# Patient Record
Sex: FEMALE | Race: WHITE | NOT HISPANIC OR LATINO | Employment: PART TIME | URBAN - METROPOLITAN AREA
[De-identification: names, ages, dates, MRNs, and addresses within clinical notes are randomized per-mention and may not be internally consistent; named-entity substitution may affect disease eponyms.]

---

## 2021-09-24 ENCOUNTER — HOSPITAL ENCOUNTER (OUTPATIENT)
Facility: CLINIC | Age: 36
End: 2021-09-24
Admitting: OBSTETRICS & GYNECOLOGY
Payer: COMMERCIAL

## 2022-03-05 ENCOUNTER — HOSPITAL ENCOUNTER (OUTPATIENT)
Facility: CLINIC | Age: 37
Setting detail: OBSERVATION
Discharge: HOME | End: 2022-03-06
Attending: EMERGENCY MEDICINE | Admitting: SURGERY
Payer: COMMERCIAL

## 2022-03-05 DIAGNOSIS — G89.18 ACUTE POST-OPERATIVE PAIN: Primary | ICD-10-CM

## 2022-03-05 DIAGNOSIS — K35.209 ACUTE APPENDICITIS WITH GENERALIZED PERITONITIS, WITHOUT GANGRENE OR ABSCESS, UNSPECIFIED WHETHER PERFORATION PRESENT: ICD-10-CM

## 2022-03-05 PROBLEM — O24.410 DIET CONTROLLED WHITE CLASSIFICATION A1 GESTATIONAL DIABETES MELLITUS (GDM): Status: ACTIVE | Noted: 2021-10-08

## 2022-03-05 PROBLEM — Z87.59 HISTORY OF POSTPARTUM HEMORRHAGE: Status: ACTIVE | Noted: 2021-10-08

## 2022-03-05 PROBLEM — B02.9 HERPES ZOSTER WITHOUT COMPLICATION: Status: ACTIVE | Noted: 2020-08-22

## 2022-03-05 PROBLEM — O42.92 FULL-TERM PREMATURE RUPTURE OF MEMBRANES: Status: ACTIVE | Noted: 2021-10-08

## 2022-03-05 PROBLEM — O09.523 AMA (ADVANCED MATERNAL AGE) MULTIGRAVIDA 35+, THIRD TRIMESTER: Status: ACTIVE | Noted: 2021-10-08

## 2022-03-05 LAB
ALBUMIN SERPL-MCNC: 4.3 G/DL (ref 3.4–5)
ALP SERPL-CCNC: 74 U/L (ref 40–150)
ALT SERPL W P-5'-P-CCNC: 21 U/L (ref 0–50)
ANION GAP SERPL CALCULATED.3IONS-SCNC: 7 MMOL/L (ref 3–14)
AST SERPL W P-5'-P-CCNC: 11 U/L (ref 0–45)
BASOPHILS # BLD AUTO: 0 10E3/UL (ref 0–0.2)
BASOPHILS NFR BLD AUTO: 0 %
BILIRUB SERPL-MCNC: 0.9 MG/DL (ref 0.2–1.3)
BUN SERPL-MCNC: 21 MG/DL (ref 7–30)
CALCIUM SERPL-MCNC: 9.5 MG/DL (ref 8.5–10.1)
CHLORIDE BLD-SCNC: 104 MMOL/L (ref 94–109)
CO2 SERPL-SCNC: 26 MMOL/L (ref 20–32)
CREAT SERPL-MCNC: 0.8 MG/DL (ref 0.52–1.04)
EOSINOPHIL # BLD AUTO: 0 10E3/UL (ref 0–0.7)
EOSINOPHIL NFR BLD AUTO: 0 %
ERYTHROCYTE [DISTWIDTH] IN BLOOD BY AUTOMATED COUNT: 12.6 % (ref 10–15)
GFR SERPL CREATININE-BSD FRML MDRD: >90 ML/MIN/1.73M2
GLUCOSE BLD-MCNC: 131 MG/DL (ref 70–99)
HCG SERPL QL: NEGATIVE
HCT VFR BLD AUTO: 39.6 % (ref 35–47)
HGB BLD-MCNC: 13.1 G/DL (ref 11.7–15.7)
IMM GRANULOCYTES # BLD: 0 10E3/UL
IMM GRANULOCYTES NFR BLD: 0 %
LIPASE SERPL-CCNC: 118 U/L (ref 73–393)
LYMPHOCYTES # BLD AUTO: 0.7 10E3/UL (ref 0.8–5.3)
LYMPHOCYTES NFR BLD AUTO: 7 %
MCH RBC QN AUTO: 27.2 PG (ref 26.5–33)
MCHC RBC AUTO-ENTMCNC: 33.1 G/DL (ref 31.5–36.5)
MCV RBC AUTO: 82 FL (ref 78–100)
MONOCYTES # BLD AUTO: 0.5 10E3/UL (ref 0–1.3)
MONOCYTES NFR BLD AUTO: 5 %
NEUTROPHILS # BLD AUTO: 9.4 10E3/UL (ref 1.6–8.3)
NEUTROPHILS NFR BLD AUTO: 88 %
NRBC # BLD AUTO: 0 10E3/UL
NRBC BLD AUTO-RTO: 0 /100
PLATELET # BLD AUTO: 218 10E3/UL (ref 150–450)
POTASSIUM BLD-SCNC: 3.5 MMOL/L (ref 3.4–5.3)
PROT SERPL-MCNC: 7.7 G/DL (ref 6.8–8.8)
RBC # BLD AUTO: 4.81 10E6/UL (ref 3.8–5.2)
SODIUM SERPL-SCNC: 137 MMOL/L (ref 133–144)
WBC # BLD AUTO: 10.7 10E3/UL (ref 4–11)

## 2022-03-05 PROCEDURE — 99285 EMERGENCY DEPT VISIT HI MDM: CPT | Mod: 25

## 2022-03-05 PROCEDURE — 80053 COMPREHEN METABOLIC PANEL: CPT | Performed by: EMERGENCY MEDICINE

## 2022-03-05 PROCEDURE — 83690 ASSAY OF LIPASE: CPT | Performed by: EMERGENCY MEDICINE

## 2022-03-05 PROCEDURE — 96374 THER/PROPH/DIAG INJ IV PUSH: CPT

## 2022-03-05 PROCEDURE — 258N000003 HC RX IP 258 OP 636: Performed by: EMERGENCY MEDICINE

## 2022-03-05 PROCEDURE — 36415 COLL VENOUS BLD VENIPUNCTURE: CPT | Performed by: EMERGENCY MEDICINE

## 2022-03-05 PROCEDURE — 250N000011 HC RX IP 250 OP 636: Performed by: EMERGENCY MEDICINE

## 2022-03-05 PROCEDURE — 96375 TX/PRO/DX INJ NEW DRUG ADDON: CPT

## 2022-03-05 PROCEDURE — C9803 HOPD COVID-19 SPEC COLLECT: HCPCS

## 2022-03-05 PROCEDURE — 84703 CHORIONIC GONADOTROPIN ASSAY: CPT | Performed by: EMERGENCY MEDICINE

## 2022-03-05 PROCEDURE — 96361 HYDRATE IV INFUSION ADD-ON: CPT

## 2022-03-05 PROCEDURE — 85025 COMPLETE CBC W/AUTO DIFF WBC: CPT | Performed by: EMERGENCY MEDICINE

## 2022-03-05 PROCEDURE — 96376 TX/PRO/DX INJ SAME DRUG ADON: CPT

## 2022-03-05 RX ORDER — SODIUM CHLORIDE 9 MG/ML
INJECTION, SOLUTION INTRAVENOUS CONTINUOUS
Status: DISCONTINUED | OUTPATIENT
Start: 2022-03-05 | End: 2022-03-06 | Stop reason: HOSPADM

## 2022-03-05 RX ORDER — ACETAMINOPHEN 500 MG
1000 TABLET ORAL ONCE
Status: COMPLETED | OUTPATIENT
Start: 2022-03-05 | End: 2022-03-06

## 2022-03-05 RX ORDER — ONDANSETRON 2 MG/ML
4 INJECTION INTRAMUSCULAR; INTRAVENOUS EVERY 30 MIN PRN
Status: COMPLETED | OUTPATIENT
Start: 2022-03-05 | End: 2022-03-06

## 2022-03-05 RX ADMIN — SODIUM CHLORIDE 1000 ML: 9 INJECTION, SOLUTION INTRAVENOUS at 23:22

## 2022-03-05 RX ADMIN — ONDANSETRON 4 MG: 2 INJECTION INTRAMUSCULAR; INTRAVENOUS at 23:25

## 2022-03-05 ASSESSMENT — ENCOUNTER SYMPTOMS
DIZZINESS: 1
DIARRHEA: 0
NAUSEA: 1
FEVER: 0
CHILLS: 1
CONSTIPATION: 0
APPETITE CHANGE: 1
HEADACHES: 0
COUGH: 0
VOMITING: 0
DYSURIA: 0
ABDOMINAL PAIN: 1

## 2022-03-06 ENCOUNTER — APPOINTMENT (OUTPATIENT)
Dept: CT IMAGING | Facility: CLINIC | Age: 37
End: 2022-03-06
Attending: EMERGENCY MEDICINE
Payer: COMMERCIAL

## 2022-03-06 ENCOUNTER — ANESTHESIA (OUTPATIENT)
Dept: SURGERY | Facility: CLINIC | Age: 37
End: 2022-03-06
Payer: COMMERCIAL

## 2022-03-06 ENCOUNTER — ANESTHESIA EVENT (OUTPATIENT)
Dept: SURGERY | Facility: CLINIC | Age: 37
End: 2022-03-06
Payer: COMMERCIAL

## 2022-03-06 VITALS
SYSTOLIC BLOOD PRESSURE: 108 MMHG | TEMPERATURE: 99.1 F | OXYGEN SATURATION: 96 % | BODY MASS INDEX: 19.62 KG/M2 | HEIGHT: 67 IN | WEIGHT: 125 LBS | RESPIRATION RATE: 15 BRPM | DIASTOLIC BLOOD PRESSURE: 67 MMHG | HEART RATE: 75 BPM

## 2022-03-06 PROBLEM — K35.209 ACUTE APPENDICITIS WITH GENERALIZED PERITONITIS, WITHOUT GANGRENE OR ABSCESS, UNSPECIFIED WHETHER PERFORATION PRESENT: Status: ACTIVE | Noted: 2022-03-06

## 2022-03-06 LAB — SARS-COV-2 RNA RESP QL NAA+PROBE: NEGATIVE

## 2022-03-06 PROCEDURE — 360N000076 HC SURGERY LEVEL 3, PER MIN: Performed by: SURGERY

## 2022-03-06 PROCEDURE — 250N000011 HC RX IP 250 OP 636: Performed by: EMERGENCY MEDICINE

## 2022-03-06 PROCEDURE — 370N000017 HC ANESTHESIA TECHNICAL FEE, PER MIN: Performed by: SURGERY

## 2022-03-06 PROCEDURE — 258N000003 HC RX IP 258 OP 636: Performed by: ANESTHESIOLOGY

## 2022-03-06 PROCEDURE — 250N000009 HC RX 250: Performed by: EMERGENCY MEDICINE

## 2022-03-06 PROCEDURE — 250N000013 HC RX MED GY IP 250 OP 250 PS 637: Performed by: EMERGENCY MEDICINE

## 2022-03-06 PROCEDURE — 44970 LAPAROSCOPY APPENDECTOMY: CPT | Mod: AS | Performed by: PHYSICIAN ASSISTANT

## 2022-03-06 PROCEDURE — 250N000009 HC RX 250: Performed by: SURGERY

## 2022-03-06 PROCEDURE — 250N000011 HC RX IP 250 OP 636: Performed by: SURGERY

## 2022-03-06 PROCEDURE — 999N000141 HC STATISTIC PRE-PROCEDURE NURSING ASSESSMENT: Performed by: SURGERY

## 2022-03-06 PROCEDURE — 710N000012 HC RECOVERY PHASE 2, PER MINUTE: Performed by: SURGERY

## 2022-03-06 PROCEDURE — G0378 HOSPITAL OBSERVATION PER HR: HCPCS

## 2022-03-06 PROCEDURE — 710N000009 HC RECOVERY PHASE 1, LEVEL 1, PER MIN: Performed by: SURGERY

## 2022-03-06 PROCEDURE — 87635 SARS-COV-2 COVID-19 AMP PRB: CPT | Performed by: EMERGENCY MEDICINE

## 2022-03-06 PROCEDURE — 250N000011 HC RX IP 250 OP 636: Performed by: NURSE ANESTHETIST, CERTIFIED REGISTERED

## 2022-03-06 PROCEDURE — 272N000001 HC OR GENERAL SUPPLY STERILE: Performed by: SURGERY

## 2022-03-06 PROCEDURE — 250N000025 HC SEVOFLURANE, PER MIN: Performed by: SURGERY

## 2022-03-06 PROCEDURE — 258N000001 HC RX 258: Performed by: SURGERY

## 2022-03-06 PROCEDURE — 88304 TISSUE EXAM BY PATHOLOGIST: CPT | Mod: TC | Performed by: SURGERY

## 2022-03-06 PROCEDURE — 250N000009 HC RX 250: Performed by: NURSE ANESTHETIST, CERTIFIED REGISTERED

## 2022-03-06 PROCEDURE — 44970 LAPAROSCOPY APPENDECTOMY: CPT | Performed by: SURGERY

## 2022-03-06 PROCEDURE — 258N000003 HC RX IP 258 OP 636: Performed by: SURGERY

## 2022-03-06 PROCEDURE — 250N000013 HC RX MED GY IP 250 OP 250 PS 637: Performed by: PHYSICIAN ASSISTANT

## 2022-03-06 PROCEDURE — 258N000003 HC RX IP 258 OP 636: Performed by: EMERGENCY MEDICINE

## 2022-03-06 PROCEDURE — 96376 TX/PRO/DX INJ SAME DRUG ADON: CPT | Mod: XU

## 2022-03-06 PROCEDURE — 74177 CT ABD & PELVIS W/CONTRAST: CPT

## 2022-03-06 PROCEDURE — 99220 PR INITIAL OBSERVATION CARE,LEVEL III: CPT | Mod: 57 | Performed by: SURGERY

## 2022-03-06 RX ORDER — HYDROCODONE BITARTRATE AND ACETAMINOPHEN 5; 325 MG/1; MG/1
1-2 TABLET ORAL EVERY 4 HOURS PRN
Qty: 15 TABLET | Refills: 0 | Status: SHIPPED | OUTPATIENT
Start: 2022-03-06

## 2022-03-06 RX ORDER — FENTANYL CITRATE 50 UG/ML
INJECTION, SOLUTION INTRAMUSCULAR; INTRAVENOUS PRN
Status: DISCONTINUED | OUTPATIENT
Start: 2022-03-06 | End: 2022-03-06

## 2022-03-06 RX ORDER — ONDANSETRON 2 MG/ML
INJECTION INTRAMUSCULAR; INTRAVENOUS PRN
Status: DISCONTINUED | OUTPATIENT
Start: 2022-03-06 | End: 2022-03-06

## 2022-03-06 RX ORDER — HYDROMORPHONE HYDROCHLORIDE 1 MG/ML
.3-.5 INJECTION, SOLUTION INTRAMUSCULAR; INTRAVENOUS; SUBCUTANEOUS
Status: DISCONTINUED | OUTPATIENT
Start: 2022-03-06 | End: 2022-03-06 | Stop reason: HOSPADM

## 2022-03-06 RX ORDER — NEOSTIGMINE METHYLSULFATE 1 MG/ML
VIAL (ML) INJECTION PRN
Status: DISCONTINUED | OUTPATIENT
Start: 2022-03-06 | End: 2022-03-06

## 2022-03-06 RX ORDER — IOPAMIDOL 755 MG/ML
63 INJECTION, SOLUTION INTRAVASCULAR ONCE
Status: COMPLETED | OUTPATIENT
Start: 2022-03-06 | End: 2022-03-06

## 2022-03-06 RX ORDER — OXYCODONE HYDROCHLORIDE 5 MG/1
5 TABLET ORAL EVERY 4 HOURS PRN
Status: DISCONTINUED | OUTPATIENT
Start: 2022-03-06 | End: 2022-03-06 | Stop reason: HOSPADM

## 2022-03-06 RX ORDER — PROPOFOL 10 MG/ML
INJECTION, EMULSION INTRAVENOUS CONTINUOUS PRN
Status: DISCONTINUED | OUTPATIENT
Start: 2022-03-06 | End: 2022-03-06

## 2022-03-06 RX ORDER — LIDOCAINE HYDROCHLORIDE 20 MG/ML
INJECTION, SOLUTION INFILTRATION; PERINEURAL PRN
Status: DISCONTINUED | OUTPATIENT
Start: 2022-03-06 | End: 2022-03-06

## 2022-03-06 RX ORDER — KETOROLAC TROMETHAMINE 30 MG/ML
INJECTION, SOLUTION INTRAMUSCULAR; INTRAVENOUS PRN
Status: DISCONTINUED | OUTPATIENT
Start: 2022-03-06 | End: 2022-03-06

## 2022-03-06 RX ORDER — ONDANSETRON 4 MG/1
4 TABLET, ORALLY DISINTEGRATING ORAL EVERY 30 MIN PRN
Status: DISCONTINUED | OUTPATIENT
Start: 2022-03-06 | End: 2022-03-06 | Stop reason: HOSPADM

## 2022-03-06 RX ORDER — HYDRALAZINE HYDROCHLORIDE 20 MG/ML
2.5-5 INJECTION INTRAMUSCULAR; INTRAVENOUS
Status: DISCONTINUED | OUTPATIENT
Start: 2022-03-06 | End: 2022-03-06 | Stop reason: HOSPADM

## 2022-03-06 RX ORDER — PROPOFOL 10 MG/ML
INJECTION, EMULSION INTRAVENOUS PRN
Status: DISCONTINUED | OUTPATIENT
Start: 2022-03-06 | End: 2022-03-06

## 2022-03-06 RX ORDER — HYDROCODONE BITARTRATE AND ACETAMINOPHEN 5; 325 MG/1; MG/1
1 TABLET ORAL
Status: COMPLETED | OUTPATIENT
Start: 2022-03-06 | End: 2022-03-06

## 2022-03-06 RX ORDER — ONDANSETRON 4 MG/1
4 TABLET, ORALLY DISINTEGRATING ORAL EVERY 6 HOURS PRN
Status: DISCONTINUED | OUTPATIENT
Start: 2022-03-06 | End: 2022-03-06 | Stop reason: HOSPADM

## 2022-03-06 RX ORDER — ONDANSETRON 2 MG/ML
4 INJECTION INTRAMUSCULAR; INTRAVENOUS EVERY 30 MIN PRN
Status: DISCONTINUED | OUTPATIENT
Start: 2022-03-06 | End: 2022-03-06 | Stop reason: HOSPADM

## 2022-03-06 RX ORDER — SODIUM CHLORIDE, SODIUM LACTATE, POTASSIUM CHLORIDE, CALCIUM CHLORIDE 600; 310; 30; 20 MG/100ML; MG/100ML; MG/100ML; MG/100ML
INJECTION, SOLUTION INTRAVENOUS CONTINUOUS
Status: DISCONTINUED | OUTPATIENT
Start: 2022-03-06 | End: 2022-03-06 | Stop reason: HOSPADM

## 2022-03-06 RX ORDER — ONDANSETRON 2 MG/ML
4 INJECTION INTRAMUSCULAR; INTRAVENOUS EVERY 6 HOURS PRN
Status: DISCONTINUED | OUTPATIENT
Start: 2022-03-06 | End: 2022-03-06 | Stop reason: HOSPADM

## 2022-03-06 RX ORDER — HYDROMORPHONE HCL IN WATER/PF 6 MG/30 ML
0.2 PATIENT CONTROLLED ANALGESIA SYRINGE INTRAVENOUS EVERY 5 MIN PRN
Status: DISCONTINUED | OUTPATIENT
Start: 2022-03-06 | End: 2022-03-06 | Stop reason: HOSPADM

## 2022-03-06 RX ORDER — MAGNESIUM HYDROXIDE 1200 MG/15ML
LIQUID ORAL PRN
Status: DISCONTINUED | OUTPATIENT
Start: 2022-03-06 | End: 2022-03-06 | Stop reason: HOSPADM

## 2022-03-06 RX ORDER — CEFOTETAN DISODIUM 1 G/10ML
1 INJECTION, POWDER, FOR SOLUTION INTRAMUSCULAR; INTRAVENOUS ONCE
Status: COMPLETED | OUTPATIENT
Start: 2022-03-06 | End: 2022-03-06

## 2022-03-06 RX ORDER — FENTANYL CITRATE 50 UG/ML
25 INJECTION, SOLUTION INTRAMUSCULAR; INTRAVENOUS EVERY 5 MIN PRN
Status: DISCONTINUED | OUTPATIENT
Start: 2022-03-06 | End: 2022-03-06 | Stop reason: HOSPADM

## 2022-03-06 RX ORDER — AMOXICILLIN 250 MG
1-2 CAPSULE ORAL 2 TIMES DAILY
Qty: 15 TABLET | Refills: 0 | Status: SHIPPED | OUTPATIENT
Start: 2022-03-06

## 2022-03-06 RX ORDER — NALOXONE HYDROCHLORIDE 0.4 MG/ML
0.2 INJECTION, SOLUTION INTRAMUSCULAR; INTRAVENOUS; SUBCUTANEOUS
Status: DISCONTINUED | OUTPATIENT
Start: 2022-03-06 | End: 2022-03-06 | Stop reason: HOSPADM

## 2022-03-06 RX ORDER — MEPERIDINE HYDROCHLORIDE 25 MG/ML
12.5 INJECTION INTRAMUSCULAR; INTRAVENOUS; SUBCUTANEOUS EVERY 5 MIN PRN
Status: DISCONTINUED | OUTPATIENT
Start: 2022-03-06 | End: 2022-03-06 | Stop reason: HOSPADM

## 2022-03-06 RX ORDER — NALOXONE HYDROCHLORIDE 0.4 MG/ML
0.4 INJECTION, SOLUTION INTRAMUSCULAR; INTRAVENOUS; SUBCUTANEOUS
Status: DISCONTINUED | OUTPATIENT
Start: 2022-03-06 | End: 2022-03-06 | Stop reason: HOSPADM

## 2022-03-06 RX ORDER — LABETALOL HYDROCHLORIDE 5 MG/ML
5 INJECTION, SOLUTION INTRAVENOUS
Status: DISCONTINUED | OUTPATIENT
Start: 2022-03-06 | End: 2022-03-06 | Stop reason: HOSPADM

## 2022-03-06 RX ORDER — LIDOCAINE 40 MG/G
CREAM TOPICAL
Status: DISCONTINUED | OUTPATIENT
Start: 2022-03-06 | End: 2022-03-06 | Stop reason: HOSPADM

## 2022-03-06 RX ORDER — GLYCOPYRROLATE 0.2 MG/ML
INJECTION, SOLUTION INTRAMUSCULAR; INTRAVENOUS PRN
Status: DISCONTINUED | OUTPATIENT
Start: 2022-03-06 | End: 2022-03-06

## 2022-03-06 RX ORDER — DEXAMETHASONE SODIUM PHOSPHATE 4 MG/ML
INJECTION, SOLUTION INTRA-ARTICULAR; INTRALESIONAL; INTRAMUSCULAR; INTRAVENOUS; SOFT TISSUE PRN
Status: DISCONTINUED | OUTPATIENT
Start: 2022-03-06 | End: 2022-03-06

## 2022-03-06 RX ORDER — MULTIVITAMIN,THERAPEUTIC
1 TABLET ORAL DAILY
COMMUNITY

## 2022-03-06 RX ADMIN — PROPOFOL 200 MG: 10 INJECTION, EMULSION INTRAVENOUS at 11:26

## 2022-03-06 RX ADMIN — SODIUM CHLORIDE, POTASSIUM CHLORIDE, SODIUM LACTATE AND CALCIUM CHLORIDE: 600; 310; 30; 20 INJECTION, SOLUTION INTRAVENOUS at 02:48

## 2022-03-06 RX ADMIN — NEOSTIGMINE METHYLSULFATE 3 MG: 1 INJECTION, SOLUTION INTRAVENOUS at 12:06

## 2022-03-06 RX ADMIN — SODIUM CHLORIDE, PRESERVATIVE FREE: 5 INJECTION INTRAVENOUS at 04:05

## 2022-03-06 RX ADMIN — ONDANSETRON 4 MG: 2 INJECTION INTRAMUSCULAR; INTRAVENOUS at 04:34

## 2022-03-06 RX ADMIN — ACETAMINOPHEN 1000 MG: 500 TABLET, FILM COATED ORAL at 00:56

## 2022-03-06 RX ADMIN — CEFOTETAN DISODIUM 1 G: 1 INJECTION, POWDER, FOR SOLUTION INTRAMUSCULAR; INTRAVENOUS at 02:06

## 2022-03-06 RX ADMIN — MIDAZOLAM 2 MG: 1 INJECTION INTRAMUSCULAR; INTRAVENOUS at 11:21

## 2022-03-06 RX ADMIN — GLYCOPYRROLATE 0.4 MG: 0.2 INJECTION, SOLUTION INTRAMUSCULAR; INTRAVENOUS at 12:06

## 2022-03-06 RX ADMIN — ONDANSETRON 4 MG: 2 INJECTION INTRAMUSCULAR; INTRAVENOUS at 08:58

## 2022-03-06 RX ADMIN — SODIUM CHLORIDE, POTASSIUM CHLORIDE, SODIUM LACTATE AND CALCIUM CHLORIDE: 600; 310; 30; 20 INJECTION, SOLUTION INTRAVENOUS at 10:48

## 2022-03-06 RX ADMIN — KETOROLAC TROMETHAMINE 30 MG: 30 INJECTION, SOLUTION INTRAMUSCULAR at 12:06

## 2022-03-06 RX ADMIN — HYDROMORPHONE HYDROCHLORIDE 0.5 MG: 1 INJECTION, SOLUTION INTRAMUSCULAR; INTRAVENOUS; SUBCUTANEOUS at 08:58

## 2022-03-06 RX ADMIN — HYDROMORPHONE HYDROCHLORIDE 0.5 MG: 1 INJECTION, SOLUTION INTRAMUSCULAR; INTRAVENOUS; SUBCUTANEOUS at 02:36

## 2022-03-06 RX ADMIN — HYDROCODONE BITARTRATE AND ACETAMINOPHEN 1 TABLET: 5; 325 TABLET ORAL at 15:09

## 2022-03-06 RX ADMIN — SODIUM CHLORIDE 60 ML: 900 INJECTION INTRAVENOUS at 00:39

## 2022-03-06 RX ADMIN — ROCURONIUM BROMIDE 50 MG: 50 INJECTION, SOLUTION INTRAVENOUS at 11:26

## 2022-03-06 RX ADMIN — IOPAMIDOL 63 ML: 755 INJECTION, SOLUTION INTRAVENOUS at 00:39

## 2022-03-06 RX ADMIN — FENTANYL CITRATE 50 MCG: 50 INJECTION, SOLUTION INTRAMUSCULAR; INTRAVENOUS at 11:46

## 2022-03-06 RX ADMIN — FENTANYL CITRATE 50 MCG: 50 INJECTION, SOLUTION INTRAMUSCULAR; INTRAVENOUS at 11:26

## 2022-03-06 RX ADMIN — ONDANSETRON 4 MG: 2 INJECTION INTRAMUSCULAR; INTRAVENOUS at 11:45

## 2022-03-06 RX ADMIN — HYDROMORPHONE HYDROCHLORIDE 0.5 MG: 1 INJECTION, SOLUTION INTRAMUSCULAR; INTRAVENOUS; SUBCUTANEOUS at 04:35

## 2022-03-06 RX ADMIN — DEXAMETHASONE SODIUM PHOSPHATE 4 MG: 4 INJECTION, SOLUTION INTRA-ARTICULAR; INTRALESIONAL; INTRAMUSCULAR; INTRAVENOUS; SOFT TISSUE at 11:34

## 2022-03-06 RX ADMIN — ONDANSETRON 4 MG: 2 INJECTION INTRAMUSCULAR; INTRAVENOUS at 02:33

## 2022-03-06 RX ADMIN — LIDOCAINE HYDROCHLORIDE 60 MG: 20 INJECTION, SOLUTION INFILTRATION; PERINEURAL at 11:26

## 2022-03-06 RX ADMIN — PROPOFOL 50 MCG/KG/MIN: 10 INJECTION, EMULSION INTRAVENOUS at 11:31

## 2022-03-06 NOTE — ED TRIAGE NOTES
Patient presents to the ED with complaints of diffuse abdominal pain that began this morning, has not improved so she decided to come to the ED. Patient complains of some mild nausea, denied vomiting, diarrhea and constipation.

## 2022-03-06 NOTE — ANESTHESIA PREPROCEDURE EVALUATION
Anesthesia Pre-Procedure Evaluation    Patient: Fela Nunn   MRN: [id number] : 1985        Procedure : Procedure(s):  APPENDECTOMY, LAPAROSCOPIC          No past medical history on file.   No past surgical history on file.   No Known Allergies   Social History     Tobacco Use     Smoking status: Not on file     Smokeless tobacco: Not on file   Substance Use Topics     Alcohol use: Not on file      Wt Readings from Last 1 Encounters:   22 56.7 kg (125 lb)        Anesthesia Evaluation            ROS/MED HX  ENT/Pulmonary:  - neg pulmonary ROS  (-) sleep apnea   Neurologic:  - neg neurologic ROS     Cardiovascular:  - neg cardiovascular ROS     METS/Exercise Tolerance:     Hematologic:       Musculoskeletal:       GI/Hepatic:     (+) appendicitis,  (-) GERD   Renal/Genitourinary:  - neg Renal ROS     Endo:  - neg endo ROS     Psychiatric/Substance Use:       Infectious Disease:       Malignancy:       Other:            Physical Exam    Airway        Mallampati: II   TM distance: > 3 FB   Neck ROM: full   Mouth opening: > 3 cm    Respiratory Devices and Support         Dental  no notable dental history         Cardiovascular          Rhythm and rate: regular     Pulmonary           breath sounds clear to auscultation           OUTSIDE LABS:  CBC:   Lab Results   Component Value Date    WBC 10.7 2022    HGB 13.1 2022    HCT 39.6 2022     2022     BMP:   Lab Results   Component Value Date     2022    POTASSIUM 3.5 2022    CHLORIDE 104 2022    CO2 26 2022    BUN 21 2022    CR 0.80 2022     (H) 2022     COAGS: No results found for: PTT, INR, FIBR  POC:   Lab Results   Component Value Date    HCGS Negative 2022     HEPATIC:   Lab Results   Component Value Date    ALBUMIN 4.3 2022    PROTTOTAL 7.7 2022    ALT 21 2022    AST 11 2022    ALKPHOS 74 2022    BILITOTAL 0.9 2022      OTHER:   Lab Results   Component Value Date    NICK 9.5 03/05/2022    LIPASE 118 03/05/2022       Anesthesia Plan    ASA Status:  1      Anesthesia Type: General.     - Airway: ETT   Induction: Intravenous, Propofol.   Maintenance: Balanced.        Consents    Anesthesia Plan(s) and associated risks, benefits, and realistic alternatives discussed. Questions answered and patient/representative(s) expressed understanding.    - Discussed:     - Discussed with:  Patient         Postoperative Care    Pain management: Multi-modal analgesia.   PONV prophylaxis: Ondansetron (or other 5HT-3), Dexamethasone or Solumedrol, Background Propofol Infusion     Comments:                Jose Luis Ulloa MD

## 2022-03-06 NOTE — OP NOTE
General Surgery Operative Note    PREOPERATIVE DIAGNOSIS:  Acute appendicitis      POSTOPERATIVE DIAGNOSIS:  Acute gangrenous appendicitis     PROCEDURE: LAPAROSCOPIC APPENDECTOMY     ANESTHESIA:  General    SURGEON:  Eileen Haji MD    ASSISTANT:  Gela Bryan PA-C assisted in the above procedure. They provided assistance with pre-operative positioning, prepping, and draping of the patient. The assistant provided vital operative assistance with retraction using instruments thus providing the necessary exposure and visualization for the case, manipulation of tissues to achieve hemostasis, suction for visualization and assisted in closure of the wound. Post-operatively they assisted in transfer of the patient off the operative table and transition to the PACU physicians.    INDICATIONS:  Fela Nunn is a 36 year old female who presented to the Emergency Department with complaints of abdominal pain. The patient underwent an abdominal CT scan which revealed evidence of acute appendicitis. The decision was made for the patient to proceed to the operating room for laparoscopic removal of the appendix. The risks and benefits of the procedure were discussed with the patient and consent for the procedure was obtained.     PROCEDURE: The patient was brought to the preoperative area and preoperative antibiotics were administered. The patient was brought back to the operating room and placed in supine position on the operating table. A time out was completed. The anesthesia was induced, and the patient was intubated. The patient was secured to the operating table and their pressure points were padded. The patient's abdomen was prepped and draped in sterile fashion. Following infiltration with local anesthetic, the 11 blade scalpel was used to made a small incision in the patient's left upper quadrant. Entrance into the abdomen was then gained under direct visualization using the 5 mm scope with the 5 mm Visiport. When  the abdomen had been safely entered, it was insufflated and the scope reinserted. Initial inspection revealed no evidence of injury at the port entry site. Under direct visualization, two additional ports were placed, one 5 mm left lateral port, and one 12 mm left lower quadrant port. Graspers were inserted, and the patient's appendix was identified. The appendix was freed from surrounding tissue using blunt dissection. The appendix was found to be inflamed and gangrenous, and there was no evidence of rupture. When the appendix had been freed, the [place] dissector was used to make a window at the base of the appendix. The laparoscopic stapler was brought into the field and inserted within the abdomen, and the appendix was transected at the base using a single firing of the blue load of the Endo GWEN stapler. The mesoappendix was taken using a single firing of a gray load of the Endo GWEN stapler. When the appendix was then completely dissected free, it was placed in an Endo catch bag and removed through the patient's left lower quadrant port site. The ports were reinserted and the patient's right lower quadrant was inspected. The staple line along the cecum was found to be intact without evidence of bleeding. There was no bleeding from the mesoappendix.  The patient's right lower quadrant was then irrigated with normal saline solution.  Attention was then turned to the patient's left lower quadrant. The 12 mm port was removed and there was no evidence of bleeding at the port exit site.  The fascia at the port site was reapproximated using an 0 Vicryl stitch and the Arnoldo-Donna fascial closure device. The patient's left lateral port was then removed under direct visualization. There was no evidence of bleeding at the port exit site. The patient's abdomen was then allowed to desufflate fully. The port sites were inspected, and hemostasis was obtained using electrocautery. The port sites were then reapproximated  using 4-0 Monocryl subcuticular stitches. The incisions were washed and dried, and sterile dressings were applied. The lap, needle and instrument counts were correct at the end of the procedure. The patient tolerated the procedure well and was extubated and taken to the recovery area in stable condition.     ESTIMATED BLOOD LOSS: 5 mL    INTRAOPERATIVE FINDINGS:  Acute gangrenous appendicitis without evidence of rupture.     SPECIMENS: Appendix     DRAINS: None    CONDITION: The patient will be discharged to home directly from the Postanesthesia Care Unit with instructions for postoperative cares and medications for pain management.     Eileen Haji MD

## 2022-03-06 NOTE — H&P
[facility]  [facility] History and Physical    PCP:  Sierra Celis [phone]    Assessment and Plan:  Patient is a 36 year old female with acute appendicitis    PLAN:   -N.p.o. and IV fluids  -Preoperative antibiotics  -Proceed to the operating room for laparoscopic appendectomy.  The risks and benefits of the procedure were discussed with the patient, who is in agreement with proceeding.    Chief Complaint: Abdominal pain    HPI: Patient is a 36 year old female who presents with abdominal pain.  The patient reports that she began having pain primarily located in the upper abdomen starting on Saturday afternoon.  She does describe some associated nausea/vomiting.  She has been having chills.  Over time, the pain has migrated and is now settled in the right lower quadrant of the abdomen.  Due to the severity of her symptoms, the patient presented to the emergency department for further evaluation.  Family history significant for appendicitis in the patient's mother.  The patient has not undergone any previous abdominal surgical procedures.    PMH: Assessed and noncontributory  PSH: No previous abdominal surgeries  Social History: Patient denies use of tobacco.  Occasional alcohol use.  Family History: Brother with biliary atresia  Home Medications:   Prior to Admission medications    Medication Sig Last Dose Taking? Auth Provider   HYDROcodone-acetaminophen (NORCO) 5-325 MG tablet Take 1-2 tablets by mouth every 4 hours as needed for moderate to severe pain  Yes Gela Bryan PA-C   senna-docusate (SENOKOT-S/PERICOLACE) 8.6-50 MG tablet Take 1-2 tablets by mouth 2 times daily  Yes Gela Bryan PA-C     Allergies: No Known Allergies    ROS:  Constitutional:  chills  Eyes: No blurred or double vision  HENT: Denies headaches, No rhinorrhea, No sore throat  Respiratory: No cough or shortness of breath  Cardiovascular: Denies chest pain or palpitations  Gastrointestinal: Abdominal pain  "and nausea/vomiting  Genitourinary: No hematuria or dysuria  Musculoskeletal: myalgias  Neurologic: No numbness or tingling  Integumentary: No skin rashes    Physical Exam:  /48 (BP Location: Right arm, Patient Position: Per self)   Pulse 67   Temp 97.5  F (36.4  C) (Oral)   Resp 18   Ht 1.702 m (5' 7\")   Wt 56.7 kg (125 lb)   SpO2 93%   BMI 19.58 kg/m    General: Vital signs reviewed, in no apparent distress  Eyes: Anicteric  HENT: Normocephalic, atraumatic, trachea midline   Respiratory: Breathing nonlabored  Cardiovascular: Regular rate and rhythm  GI: Abdomen soft, nondistended, tender with palpation in the right lower quadrant, positive Rovsing's  Musculoskeletal: No gross deformities  Neurologic: Grossly nonfocal exam  Psychiatric: Normal mood, affect and insight  Integumentary: Warm and dry    Labs:  All new lab and imaging data was reviewed-  Recent Labs   Lab 03/05/22  2319   WBC 10.7   HGB 13.1   HCT 39.6   MCV 82        Recent Labs   Lab 03/05/22  2319      POTASSIUM 3.5   CHLORIDE 104   CO2 26   ANIONGAP 7   *   BUN 21   CR 0.80   GFRESTIMATED >90   NICK 9.5   PROTTOTAL 7.7   ALBUMIN 4.3   BILITOTAL 0.9   ALKPHOS 74   AST 11   ALT 21       Radiology:  Radiology images personally reviewed-  CT ABDOMEN PELVIS W CONTRAST  LOCATION: [facility]  DATE/TIME: 3/6/2022 12:23 AM     FINDINGS:   LOWER CHEST: Normal.     HEPATOBILIARY: Normal.     PANCREAS: Normal.     SPLEEN: Normal.     ADRENAL GLANDS: Normal.     KIDNEYS/BLADDER: Tiny benign left renal cyst requiring no further evaluation otherwise normal.     BOWEL: Prominent appendix with adjacent inflammatory change. Small amount of free fluid in the cul-de-sac. No abscess, free air or bowel obstruction.     LYMPH NODES: Normal.     VASCULATURE: Unremarkable.     PELVIC ORGANS: Normal.     MUSCULOSKELETAL: Scoliosis. Transitional vertebra lumbosacral junction. Right-sided sacroiliitis.                   "                                                    IMPRESSION:   1.  Acute appendicitis.    Eileen Haji MD

## 2022-03-06 NOTE — ED NOTES
[facility]  ED Nurse Handoff Report    ED Chief complaint: Abdominal Pain      ED Diagnosis:   Final diagnoses:   None       Code Status: to be addressed by admitting doctor    Allergies: No Known Allergies    Patient Story: Patient c/o diffuse abdominal pain that started this morning, cramping/bloated type pain. With nausea  Focused Assessment:  A&O x4, voices needs. Nausea resolved at this time. Abdominal pain 2-3/10.    Treatments and/or interventions provided:   Results for orders placed or performed during the hospital encounter of 03/05/22   CT Abdomen Pelvis w Contrast     Status: None    Narrative    EXAM: CT ABDOMEN PELVIS W CONTRAST  LOCATION: [facility]  DATE/TIME: 3/6/2022 12:23 AM    INDICATION: RLQ abdominal pain, appendicitis suspected (Age >= 14y)  COMPARISON: None.  TECHNIQUE: CT scan of the abdomen and pelvis was performed following injection of IV contrast. Multiplanar reformats were obtained. Dose reduction techniques were used.  CONTRAST:  63 ml Isovue 370    FINDINGS:   LOWER CHEST: Normal.    HEPATOBILIARY: Normal.    PANCREAS: Normal.    SPLEEN: Normal.    ADRENAL GLANDS: Normal.    KIDNEYS/BLADDER: Tiny benign left renal cyst requiring no further evaluation otherwise normal.    BOWEL: Prominent appendix with adjacent inflammatory change. Small amount of free fluid in the cul-de-sac. No abscess, free air or bowel obstruction.    LYMPH NODES: Normal.    VASCULATURE: Unremarkable.    PELVIC ORGANS: Normal.    MUSCULOSKELETAL: Scoliosis. Transitional vertebra lumbosacral junction. Right-sided sacroiliitis.      Impression    IMPRESSION:   1.  Acute appendicitis.   Comprehensive metabolic panel     Status: Abnormal   Result Value Ref Range    Sodium 137 133 - 144 mmol/L    Potassium 3.5 3.4 - 5.3 mmol/L    Chloride 104 94 - 109 mmol/L    Carbon Dioxide (CO2) 26 20 - 32 mmol/L    Anion Gap 7 3 - 14 mmol/L    Urea Nitrogen 21 7 - 30 mg/dL    Creatinine  0.80 0.52 - 1.04 mg/dL    Calcium 9.5 8.5 - 10.1 mg/dL    Glucose 131 (H) 70 - 99 mg/dL    Alkaline Phosphatase 74 40 - 150 U/L    AST 11 0 - 45 U/L    ALT 21 0 - 50 U/L    Protein Total 7.7 6.8 - 8.8 g/dL    Albumin 4.3 3.4 - 5.0 g/dL    Bilirubin Total 0.9 0.2 - 1.3 mg/dL    GFR Estimate >90 >60 mL/min/1.73m2   Lipase     Status: Normal   Result Value Ref Range    Lipase 118 73 - 393 U/L   HCG qualitative Blood     Status: Normal   Result Value Ref Range    hCG Serum Qualitative Negative Negative   CBC with platelets and differential     Status: Abnormal   Result Value Ref Range    WBC Count 10.7 4.0 - 11.0 10e3/uL    RBC Count 4.81 3.80 - 5.20 10e6/uL    Hemoglobin 13.1 11.7 - 15.7 g/dL    Hematocrit 39.6 35.0 - 47.0 %    MCV 82 78 - 100 fL    MCH 27.2 26.5 - 33.0 pg    MCHC 33.1 31.5 - 36.5 g/dL    RDW 12.6 10.0 - 15.0 %    Platelet Count 218 150 - 450 10e3/uL    % Neutrophils 88 %    % Lymphocytes 7 %    % Monocytes 5 %    % Eosinophils 0 %    % Basophils 0 %    % Immature Granulocytes 0 %    NRBCs per 100 WBC 0 <1 /100    Absolute Neutrophils 9.4 (H) 1.6 - 8.3 10e3/uL    Absolute Lymphocytes 0.7 (L) 0.8 - 5.3 10e3/uL    Absolute Monocytes 0.5 0.0 - 1.3 10e3/uL    Absolute Eosinophils 0.0 0.0 - 0.7 10e3/uL    Absolute Basophils 0.0 0.0 - 0.2 10e3/uL    Absolute Immature Granulocytes 0.0 <=0.4 10e3/uL    Absolute NRBCs 0.0 10e3/uL   CBC with platelets differential     Status: Abnormal    Narrative    The following orders were created for panel order CBC with platelets differential.  Procedure                               Abnormality         Status                     ---------                               -----------         ------                     CBC with platelets and d...[[id number]]  Abnormal            Final result                 Please view results for these tests on the individual orders.     Patient's response to treatments and/or interventions: tolerated well    To be done/followed up on inpatient  "unit:  unknown    Does this patient have any cognitive concerns?: none    Activity level - Baseline/Home:  Independent  Activity Level - Current:   Independent    Patient's Preferred language: English   Needed?: No    Isolation: None  Infection: Not Applicable  Patient tested for COVID 19 prior to admission: YES  Bariatric?: No    Vital Signs:   Vitals:    03/05/22 2249   BP: 118/65   Pulse: 89   Resp: 16   Temp: 98.4  F (36.9  C)   TempSrc: Temporal   SpO2: 100%   Weight: 56.7 kg (125 lb)   Height: 1.702 m (5' 7\")       Cardiac Rhythm:     Was the PSS-3 completed:   Yes  What interventions are required if any?               Family Comments: none present  OBS brochure/video discussed/provided to patient/family: Yes              Name of person given brochure if not patient: pt              Relationship to patient: pt    For the majority of the shift this patient's behavior was Green.   Behavioral interventions performed were none.    ED NURSE PHONE NUMBER: *[id number]       "

## 2022-03-06 NOTE — DISCHARGE INSTRUCTIONS
Today you received Toradol, an antiinflammatory medication similar to Ibuprofen.  You should not take other antiinflammatory medication, such as Ibuprofen, Motrin, Advil, Aleve, Naprosyn, etc until 6:15pm.       [facility]  Discharge Instructions: Post-Operative Laparoscopic Appendectomy    ACTIVITY    Expect to feel tired after your surgery.  This will gradually resolve.      Take frequent, short walks and increase your activity gradually.      Avoid strenuous physical activity or heavy lifting greater than 15-20 lbs. for 2-3 weeks.  You may climb stairs.    You may drive without restrictions when you are not using any prescription pain medication and feel comfortable in a car.    You may return to work/school when you are comfortable without any prescription pain medication.    WOUND CARE    You may remove your outer dressing or Band-Aids and shower 48 hours after the surgery.  Pat your incisions dry and leave them open to air.  Re-apply dressing (Band-Aids or gauze/tape) as needed for comfort or drainage.    You may have steri-strips (looks like white tape) on your incision.  You may peel off the steri-strips 2 weeks after your surgery if they have not peeled off on their own.     Do not soak your incisions in a tub or pool for 2 weeks.     Do not apply any lotions, creams, or ointments to your incisions.    A ridge under your incisions is normal and will gradually resolve.    DIET    Start with liquids, then gradually resume your regular diet as tolerated.  Avoid heavy, spicy, and greasy meals for 2-3 days.    Drink plenty of fluids to stay hydrated.    PAIN    Expect some tenderness and discomfort at the incision sites.  Use the prescribed pain medication at your discretion.  Expect gradual resolution of your pain over several days.    You may take ibuprofen with food (unless you have been told not to) or acetaminophen/Tylenol instead of or in addition to your prescribed pain  medication.  However, if you are taking Norco or Percocet, do not take any additional acetaminophen/Tylenol.    Do not drink alcohol or drive while you are taking pain medications.    You may apply ice to your incisions in 20 minute intervals as needed for the next 48 hours.  After that time, consider switching to heat if you prefer.    EXPECTATIONS    Pain medications can cause constipation.  Limit use when possible.  Take an over the counter or prescribed stool softener/stimulant, such as Colace or Senna, 1-2 times a day with plenty of water.  You may take a mild over the counter laxative, such as Miralax or a suppository, as needed.  You make discontinue these medications once you are having regular bowel movements and/or are no longer taking your narcotic pain medication.     You may have shoulder or upper back discomfort due to the gas used in surgery.  This is temporary and should resolve in 48-72 hours.  Short, frequent walks may help with this.    If you are unable to urinate for 8 hours or feel as though you are not emptying your bladder adequately, we recommend you seek care at an ER or Urgent Care facility for possible catheter placement.     FOLLOW UP    Our office will contact you in approximately 2-3 weeks to check on your progress and answer any questions you may have.  If you are doing well, you will not need to return for a follow up appointment.  If any concerns are identified over the phone, we will help you make an appointment to see a provider.     If you have not received a phone call, have any questions or concerns, or would like to be seen, please call us at [phone] and ask to speak with our nurse.  We are located at [address] [place] [address].    CALL OUR OFFICE [phone] IF YOU HAVE:     Chills or fever above 101 F.    Increased redness, warmth, or drainage at your incisions.    Significant bleeding.    Pain not relieved by your pain medication or  rest.    Increasing pain after the first 48 hours.    Any other concerns or questions.           Revised December 2021      Same Day Surgery Discharge Instructions for  Sedation and General Anesthesia       It's not unusual to feel dizzy, light-headed or faint for up to 24 hours after surgery or while taking pain medication.  If you have these symptoms: sit for a few minutes before standing and have someone assist you when you get up to walk or use the bathroom.      You should rest and relax for the next 24 hours. We recommend you make arrangements to have an adult stay with you for at least 24 hours after your discharge.  Avoid hazardous and strenuous activity.      DO NOT DRIVE any vehicle or operate mechanical equipment for 24 hours following the end of your surgery.  Even though you may feel normal, your reactions may be affected by the medication you have received.      Do not drink alcoholic beverages for 24 hours following surgery.       Slowly progress to your regular diet as you feel able. It's not unusual to feel nauseated and/or vomit after receiving anesthesia.  If you develop these symptoms, drink clear liquids (apple juice, ginger ale, broth, 7-up, etc. ) until you feel better.  If your nausea and vomiting persists for 24 hours, please notify your surgeon.        All narcotic pain medications, along with inactivity and anesthesia, can cause constipation. Drinking plenty of liquids and increasing fiber intake will help.      For any questions of a medical nature, call your surgeon.      Do not make important decisions for 24 hours.      If you had general anesthesia, you may have a sore throat for a couple of days related to the breathing tube used during surgery.  You may use Cepacol lozenges to help with this discomfort.  If it worsens or if you develop a fever, contact your surgeon.       If you feel your pain is not well managed with the pain medications prescribed by your surgeon, please contact  your surgeon's office to let them know so they can address your concerns.       CoVid 19 Information    We want to give you information regarding Covid. Please consult your primary care provider with any questions you might have.     Patient who have symptoms (cough, fever, or shortness of breath), need to isolate for 7 days from when symptoms started OR 72 hours after fever resolves (without fever reducing medications) AND improvement of respiratory symptoms (whichever is longer).      Isolate yourself at home (in own room/own bathroom if possible)    Do Not allow any visitors    Do Not go to work or school    Do Not go to [employer],  centers, shopping, or other public places.    Do Not shake hands.    Avoid close and intimate contact with others (hugging, kissing).    Follow CDC recommendations for household cleaning of frequently touched services.     After the initial 7 days, continue to isolate yourself from household members as much as possible. To continue decrease the risk of community spread and exposure, you and any members of your household should limit activities in public for 14 days after starting home isolation.     You can reference the following CDC link for helpful home isolation/care tips:  https://www.cdc.gov/coronavirus/2019-ncov/downloads/10Things.pdf    Protect Others:    Cover Your Mouth and Nose with a mask, disposable tissue or wash cloth to avoid spreading germs to others.    Wash your hands and face frequently with soap and water    Call Your Primary Doctor If: Breathing difficulty develops or you become worse.    For more information about COVID19 and options for caring for yourself at home, please visit the CDC website at https://www.cdc.gov/coronavirus/2019-ncov/about/steps-when-sick.html  For more options for care at [facility], please visit our website at [email or web address]    .**If you have concerns or questions about your procedure,     please contact Dr Haji at  [phone]**

## 2022-03-06 NOTE — PROGRESS NOTES
RECEIVING UNIT ED HANDOFF REVIEW    ED Nurse Handoff Report was reviewed by: Amy Lozoya RN on March 6, 2022 at 3:18 AM

## 2022-03-06 NOTE — ED PROVIDER NOTES
"  History     Chief Complaint:  Abdominal Pain    The history is provided by the patient.      Fela Nunn is a 36 year old female who presents with abdominal pain. The patient reports that starting this morning, she has had an acidic feeling stomach ache, primarily localized to he upper abdomen. She used Zantac, but without much relief. She also endorses associated nausea, decreased appetite, chills, and mild dizziness. Her symptoms have been worsening throughout the day, prompting her visit to the ED. More recently this evening, her abdominal pain seems to have moved to her lower abdomen, maybe slightly to the right lower quadrant. Patient denies any vomiting, fever, dysuria, cough, headache, or new/worsening diarrhea or constipation. Her last period was over a year ago. She is currently nursing her five month old at home.     Review of Systems   Constitutional: Positive for appetite change (decreased) and chills. Negative for fever.   Respiratory: Negative for cough.    Gastrointestinal: Positive for abdominal pain and nausea. Negative for constipation, diarrhea and vomiting.   Genitourinary: Negative for dysuria.   Neurological: Positive for dizziness. Negative for headaches.   All other systems reviewed and are negative.    Allergies:  The patient has no known allergies.    Medications:    The patient is not currently taking any prescribed medications.    Past Medical History:    Chicken Pox     Past Surgical History:    Dilation and curettage     Social History:  The patient presents to the ED alone.     Physical Exam     Patient Vitals for the past 24 hrs:   BP Temp Temp src Pulse Resp SpO2 Height Weight   03/06/22 0250 -- -- -- -- -- 93 % -- --   03/05/22 2249 118/65 98.4  F (36.9  C) Temporal 89 16 100 % 1.702 m (5' 7\") 56.7 kg (125 lb)       Physical Exam  General: alert, lying on her right side on the gurney, which is position of comfort  HENT: mucous membranes moist  CV: regular rate, regular " rhythm, no murmurs rubs or gallops.  Resp: normal effort, clear throughout, no crackles or wheezing  GI: abdomen soft, with generalized abdominal tenderness, but most significant in the right lower quadrant.  No rebound or guarding.  Negative Bhatia sign.  MSK: no bony tenderness  Skin: appropriately warm and dry  Extremities: no edema, calves non-tender  Neuro: alert, clear speech, oriented  Psych: normal mood and affect    Emergency Department Course     Imaging:  CT Abdomen Pelvis w Contrast   Final Result   IMPRESSION:    1.  Acute appendicitis.          Laboratory:  Labs Ordered and Resulted from Time of ED Arrival to Time of ED Departure   COMPREHENSIVE METABOLIC PANEL - Abnormal       Result Value    Sodium 137      Potassium 3.5      Chloride 104      Carbon Dioxide (CO2) 26      Anion Gap 7      Urea Nitrogen 21      Creatinine 0.80      Calcium 9.5      Glucose 131 (*)     Alkaline Phosphatase 74      AST 11      ALT 21      Protein Total 7.7      Albumin 4.3      Bilirubin Total 0.9      GFR Estimate >90     CBC WITH PLATELETS AND DIFFERENTIAL - Abnormal    WBC Count 10.7      RBC Count 4.81      Hemoglobin 13.1      Hematocrit 39.6      MCV 82      MCH 27.2      MCHC 33.1      RDW 12.6      Platelet Count 218      % Neutrophils 88      % Lymphocytes 7      % Monocytes 5      % Eosinophils 0      % Basophils 0      % Immature Granulocytes 0      NRBCs per 100 WBC 0      Absolute Neutrophils 9.4 (*)     Absolute Lymphocytes 0.7 (*)     Absolute Monocytes 0.5      Absolute Eosinophils 0.0      Absolute Basophils 0.0      Absolute Immature Granulocytes 0.0      Absolute NRBCs 0.0     LIPASE - Normal    Lipase 118     HCG QUALITATIVE PREGNANCY - Normal    hCG Serum Qualitative Negative     ROUTINE UA WITH MICROSCOPIC REFLEX TO CULTURE   COVID-19 VIRUS (CORONAVIRUS) BY PCR     Emergency Department Course:       Reviewed:  I reviewed nursing notes, vitals, past history and care  everywhere    Assessments:  2301 I obtained history and examined the patient as noted above.   0134 I rechecked the patient and explained findings. Prepared for admission.      Consults:   0142 I spoke with Dr. Rich, Surgery, regarding the patient, who agreed to take the patient.        Interventions:  2322 NS 1 L IV   2325 Zofran 4 mg IV   0056 Tylenol 1,000 mg PO    Disposition:  The patient was admitted to the hospital under the care of Dr. Rich.    Impression & Plan    Medical Decision Making:  Fela Nunn is a 36 year old otherwise healthy female who presents with abdominal pain concerning for appendicitis. CT scan confirms the presence of appendicitis, and there is no evidence of rupture or abscess at this time. The patient s symptoms have been controlled with interventions in the Emergency Department, and she appears more comfortable on recheck. I discussed the diagnosis with the patient and have answered all questions about the plan of care. Parenteral antibiotics have been ordered.  No evidence for sepsis or peritonitis at this time.  She is currently breast-feeding, we discussed the use of narcotics and separate TTN in the setting of breast-feeding.  I discussed the patient with the on call general surgeon, Dr. Rich, and the patient will be admitted for surgery. She has remained hemodynamically stable in the Emergency Department.      Critical Care time:  none    Diagnosis:    ICD-10-CM    1. Acute appendicitis with generalized peritonitis, without gangrene or abscess, unspecified whether perforation present  K35.20      Scribe Disclosure:  NELSY, Deshawn Fabianoarmando, am serving as a scribe at 11:03 PM on 3/5/2022 to document services personally performed by Elly Perry MD based on my observations and the provider's statements to me.      Elly Perry MD  03/06/22 6712

## 2022-03-06 NOTE — ANESTHESIA POSTPROCEDURE EVALUATION
Patient: Fela Nunn    Procedure: Procedure(s):  APPENDECTOMY, LAPAROSCOPIC       Anesthesia Type:  General    Note:     Postop Pain Control: Uneventful            Sign Out: Well controlled pain   PONV: No   Neuro/Psych: Uneventful            Sign Out: Acceptable/Baseline neuro status   Airway/Respiratory: Uneventful            Sign Out: Acceptable/Baseline resp. status   CV/Hemodynamics: Uneventful            Sign Out: Acceptable CV status; No obvious hypovolemia; No obvious fluid overload   Other NRE: NONE   DID A NON-ROUTINE EVENT OCCUR? No           Last vitals:  Vitals Value Taken Time   /65 03/06/22 1300   Temp 37.4  C (99.3  F) 03/06/22 1220   Pulse 87 03/06/22 1301   Resp 10 03/06/22 1301   SpO2 98 % 03/06/22 1301   Vitals shown include unvalidated device data.    Electronically Signed By: Jose Luis Ulloa MD  March 6, 2022  1:02 PM

## 2022-03-06 NOTE — PHARMACY-ADMISSION MEDICATION HISTORY
Pharmacy Medication History  Admission medication history interview status for the 3/5/2022  admission is complete. See EPIC admission navigator for prior to admission medications     Location of Interview: Patient room  Medication history sources: Patient, Surescripts and Care Everywhere    Significant changes made to the medication list:  Added - Multivitamin    In the past week, patient estimated taking medication this percent of the time: 50-90% due to other      Time spent in this activity: 20 minutes    Prior to Admission medications    Medication Sig Last Dose Taking? Auth Provider   HYDROcodone-acetaminophen (NORCO) 5-325 MG tablet Take 1-2 tablets by mouth every 4 hours as needed for moderate to severe pain  Yes Gela Bryan PA-C   multivitamin, therapeutic (THERA-VIT) TABS tablet Take 1 tablet by mouth daily Past Week at Unknown time Yes Unknown, Entered By History   senna-docusate (SENOKOT-S/PERICOLACE) 8.6-50 MG tablet Take 1-2 tablets by mouth 2 times daily  Yes Gela Bryan PA-C       The information provided in this note is only as accurate as the sources available at the time of update(s)

## 2022-03-06 NOTE — ANESTHESIA CARE TRANSFER NOTE
Patient: Fela Nunn    Procedure: Procedure(s):  APPENDECTOMY, LAPAROSCOPIC       Diagnosis: Acute appendicitis with generalized peritonitis, without gangrene or abscess, unspecified whether perforation present [K35.20]  Diagnosis Additional Information: No value filed.    Anesthesia Type:   General     Note:    Oropharynx: oropharynx clear of all foreign objects  Level of Consciousness: awake  Oxygen Supplementation: face mask    Independent Airway: airway patency satisfactory and stable  Dentition: dentition unchanged  Vital Signs Stable: post-procedure vital signs reviewed and stable  Report to RN Given: handoff report given  Patient transferred to: PACU    Handoff Report: Identifed the Patient, Identified the Reponsible Provider, Reviewed the pertinent medical history, Discussed the surgical course, Reviewed Intra-OP anesthesia mangement and issues during anesthesia, Set expectations for post-procedure period and Allowed opportunity for questions and acknowledgement of understanding      Vitals:  Vitals Value Taken Time   /57    Temp     Pulse 101    Resp 12    SpO2 100 % 03/06/22 1220   Vitals shown include unvalidated device data.    Electronically Signed By: KOKO Winston CRNA  March 6, 2022  12:20 PM

## 2022-03-08 LAB
PATH REPORT.COMMENTS IMP SPEC: NORMAL
PATH REPORT.COMMENTS IMP SPEC: NORMAL
PATH REPORT.FINAL DX SPEC: NORMAL
PATH REPORT.GROSS SPEC: NORMAL
PATH REPORT.MICROSCOPIC SPEC OTHER STN: NORMAL
PATH REPORT.RELEVANT HX SPEC: NORMAL
PHOTO IMAGE: NORMAL

## 2022-03-08 PROCEDURE — 88304 TISSUE EXAM BY PATHOLOGIST: CPT | Mod: 26 | Performed by: PATHOLOGY

## 2022-03-23 ENCOUNTER — TELEPHONE (OUTPATIENT)
Dept: SURGERY | Facility: CLINIC | Age: 37
End: 2022-03-23
Payer: COMMERCIAL

## 2022-03-23 NOTE — TELEPHONE ENCOUNTER
[facility]  Post op call note - No Answer    Fela Nunn was called for an update regarding her recovery.  There was no answer and a message was left instructing the patient to call the office with any questions or concerns.     Gela Bryan PA-C

## 2022-05-29 ENCOUNTER — HEALTH MAINTENANCE LETTER (OUTPATIENT)
Age: 37
End: 2022-05-29

## 2022-10-03 ENCOUNTER — HEALTH MAINTENANCE LETTER (OUTPATIENT)
Age: 37
End: 2022-10-03

## 2023-06-04 ENCOUNTER — HEALTH MAINTENANCE LETTER (OUTPATIENT)
Age: 38
End: 2023-06-04

## 2024-07-27 ENCOUNTER — HEALTH MAINTENANCE LETTER (OUTPATIENT)
Age: 39
End: 2024-07-27

## (undated) DEVICE — SOL WATER IRRIG 1000ML BOTTLE 2F7114

## (undated) DEVICE — DECANTER VIAL 2006S

## (undated) DEVICE — STPL ENDO RELOAD ECHELON 45X2.0MM GREY ECR45M

## (undated) DEVICE — ENDO TROCAR FIRST ENTRY KII FIOS Z-THRD 05X100MM CTF03

## (undated) DEVICE — SU VICRYL 4-0 PS-2 18" UND J496H

## (undated) DEVICE — SOL NACL 0.9% INJ 1000ML BAG 2B1324X

## (undated) DEVICE — SUCTION IRR STRYKERFLOW II W/TIP 250-070-520

## (undated) DEVICE — GLOVE PROTEXIS W/NEU-THERA 6.5  2D73TE65

## (undated) DEVICE — DEVICE SUTURE GRASPER TROCAR CLOSURE 14GA PMITCSG

## (undated) DEVICE — ENDO POUCH UNIV RETRIEVAL SYSTEM INZII 10MM CD001

## (undated) DEVICE — ENDO TROCAR SLEEVE KII Z-THREADED 05X100MM CTS02

## (undated) DEVICE — PREP CHLORAPREP 26ML TINTED ORANGE  260815

## (undated) DEVICE — PACK LAP CHOLE SLC15LCFSD

## (undated) DEVICE — LINEN TOWEL PACK X5 5464

## (undated) DEVICE — ESU HOLDER LAP INST DISP PURPLE LONG 330MM H-PRO-330

## (undated) DEVICE — NDL 22GA 1.5"

## (undated) DEVICE — SUCTION CANISTER MEDIVAC LINER 3000ML W/LID 65651-530

## (undated) DEVICE — ESU GROUND PAD UNIVERSAL W/O CORD

## (undated) DEVICE — ENDO TROCAR FIRST ENTRY KII FIOS Z-THRD 12X100MM CTF73

## (undated) DEVICE — SU VICRYL 0 UR-6 27" J603H

## (undated) DEVICE — STPL ENDO ARTICULATING 45MM EC45A

## (undated) DEVICE — ENDO SCOPE WARMER LF TM500

## (undated) DEVICE — STPL RELOAD REG TISSUE ECHELON 45 X 3.6MM BLUE GST45B

## (undated) RX ORDER — HYDROCODONE BITARTRATE AND ACETAMINOPHEN 5; 325 MG/1; MG/1
TABLET ORAL
Status: DISPENSED
Start: 2022-03-06

## (undated) RX ORDER — PROPOFOL 10 MG/ML
INJECTION, EMULSION INTRAVENOUS
Status: DISPENSED
Start: 2022-03-06

## (undated) RX ORDER — BUPIVACAINE HYDROCHLORIDE AND EPINEPHRINE 5; 5 MG/ML; UG/ML
INJECTION, SOLUTION EPIDURAL; INTRACAUDAL; PERINEURAL
Status: DISPENSED
Start: 2022-03-06

## (undated) RX ORDER — FENTANYL CITRATE 50 UG/ML
INJECTION, SOLUTION INTRAMUSCULAR; INTRAVENOUS
Status: DISPENSED
Start: 2022-03-06

## (undated) RX ORDER — LIDOCAINE HYDROCHLORIDE 10 MG/ML
INJECTION, SOLUTION INFILTRATION; PERINEURAL
Status: DISPENSED
Start: 2022-03-06

## (undated) RX ORDER — ONDANSETRON 2 MG/ML
INJECTION INTRAMUSCULAR; INTRAVENOUS
Status: DISPENSED
Start: 2022-03-06

## (undated) RX ORDER — DEXAMETHASONE SODIUM PHOSPHATE 4 MG/ML
INJECTION, SOLUTION INTRA-ARTICULAR; INTRALESIONAL; INTRAMUSCULAR; INTRAVENOUS; SOFT TISSUE
Status: DISPENSED
Start: 2022-03-06